# Patient Record
Sex: MALE | Race: WHITE | NOT HISPANIC OR LATINO
[De-identification: names, ages, dates, MRNs, and addresses within clinical notes are randomized per-mention and may not be internally consistent; named-entity substitution may affect disease eponyms.]

---

## 2017-11-30 ENCOUNTER — LAB SERVICES (OUTPATIENT)
Dept: OTHER | Age: 27
End: 2017-11-30

## 2017-11-30 ENCOUNTER — CHARTING TRANS (OUTPATIENT)
Dept: OTHER | Age: 27
End: 2017-11-30

## 2017-11-30 LAB — RAPID STREP GROUP A: POSITIVE

## 2017-11-30 ASSESSMENT — PAIN SCALES - GENERAL: PAINLEVEL_OUTOF10: 2

## 2018-11-02 VITALS
WEIGHT: 188.72 LBS | TEMPERATURE: 100.2 F | OXYGEN SATURATION: 97 % | HEART RATE: 119 BPM | BODY MASS INDEX: 25.56 KG/M2 | HEIGHT: 72 IN | RESPIRATION RATE: 18 BRPM

## 2022-08-09 ENCOUNTER — OFFICE VISIT (OUTPATIENT)
Dept: PEDIATRIC CARDIOLOGY | Age: 32
End: 2022-08-09

## 2022-08-09 ENCOUNTER — ANCILLARY PROCEDURE (OUTPATIENT)
Dept: PEDIATRIC CARDIOLOGY | Age: 32
End: 2022-08-09
Attending: PEDIATRICS

## 2022-08-09 VITALS
DIASTOLIC BLOOD PRESSURE: 80 MMHG | OXYGEN SATURATION: 97 % | BODY MASS INDEX: 27.87 KG/M2 | SYSTOLIC BLOOD PRESSURE: 144 MMHG | WEIGHT: 199.08 LBS | HEIGHT: 71 IN | HEART RATE: 92 BPM

## 2022-08-09 DIAGNOSIS — Q25.1 COARCTATION OF AORTA: ICD-10-CM

## 2022-08-09 DIAGNOSIS — Z87.74 S/P REPAIR OF COARCTATION OF AORTA: Primary | ICD-10-CM

## 2022-08-09 LAB
AORTIC ROOT: 2.78 CM (ref 2.49–3.54)
AORTIC VALVE ANNULUS: 2.25 CM (ref 1.76–2.57)
ASCENDING AORTA: 2.72 CM (ref 2.1–3.14)
BSA FOR PED ECHO PROCEDURE: 2.14 M2
FRACTIONAL SHORTENING: 45 % (ref 28–44)
LEFT VENTRICLE EJECTION FRACTION BY TEICHOLZ 2D (%): 77 %
LEFT VENTRICULAR POSTERIOR WALL IN END DIASTOLE (LVPW): 1.2 CM (ref 0.53–1.01)
LV SHORT-AXIS END-DIASTOLIC ENDOCARDIAL DIAMETER: 4.42 CM (ref 4.55–6.4)
LV SHORT-AXIS END-DIASTOLIC SEPTAL THICKNESS: 0.95 CM (ref 0.55–1.03)
LV SHORT-AXIS END-SYSTOLIC ENDOCARDIAL DIAMETER: 2.42 CM
LV THICKNESS:DIMENSION RATIO: 0.27 CM (ref 0.09–0.21)
SINOTUBULAR JUNCTION: 2.32 CM (ref 2.01–2.94)
Z SCORE OF AORTIC VALVE ANNULUS PHN: 0.4 CM
Z SCORE OF LEFT VENTRICULAR POSTERIOR WALL IN END DIASTOLE: 3.5 CM
Z SCORE OF LV SHORT-AXIS END-DIASTOLIC ENDOCARDIAL DIAMETER: -2.3 CM
Z SCORE OF LV SHORT-AXIS END-DIASTOLIC SEPTAL THICKNESS: 1.3 CM
Z SCORE OF LV THICKNESS:DIMENSION RATIO: 4
Z-SCORE OF AORTIC ROOT: -0.9 CM
Z-SCORE OF ASCENDING AORTA: 0.4 CM
Z-SCORE OF SINOTUBULAR JUNCTION PHN: -0.7 CM

## 2022-08-09 PROCEDURE — 93320 DOPPLER ECHO COMPLETE: CPT | Performed by: PEDIATRICS

## 2022-08-09 PROCEDURE — 93303 ECHO TRANSTHORACIC: CPT | Performed by: PEDIATRICS

## 2022-08-09 PROCEDURE — 93325 DOPPLER ECHO COLOR FLOW MAPG: CPT | Performed by: PEDIATRICS

## 2022-08-09 PROCEDURE — 99213 OFFICE O/P EST LOW 20 MIN: CPT | Performed by: PEDIATRICS

## 2022-08-09 ASSESSMENT — ENCOUNTER SYMPTOMS
LOSS OF CONSCIOUSNESS: 0
SHORTNESS OF BREATH: 0
HEADACHES: 0
EYE DISCHARGE: 0
CONSTITUTIONAL NEGATIVE: 1
PSYCHIATRIC NEGATIVE: 1
SPUTUM PRODUCTION: 0
EYE REDNESS: 0
BLURRED VISION: 0
DIZZINESS: 0
WHEEZING: 0
GASTROINTESTINAL NEGATIVE: 1
COUGH: 0
BRUISES/BLEEDS EASILY: 0
DOUBLE VISION: 0
ORTHOPNEA: 0
EYE PAIN: 0

## 2023-07-03 ENCOUNTER — APPOINTMENT (OUTPATIENT)
Dept: URBAN - METROPOLITAN AREA CLINIC 246 | Age: 33
Setting detail: DERMATOLOGY
End: 2023-07-03

## 2023-07-03 DIAGNOSIS — Z71.89 OTHER SPECIFIED COUNSELING: ICD-10-CM

## 2023-07-03 DIAGNOSIS — D485 NEOPLASM OF UNCERTAIN BEHAVIOR OF SKIN: ICD-10-CM

## 2023-07-03 DIAGNOSIS — D36.1 BENIGN NEOPLASM OF PERIPHERAL NERVES AND AUTONOMIC NERVOUS SYSTEM: ICD-10-CM

## 2023-07-03 DIAGNOSIS — L82.1 OTHER SEBORRHEIC KERATOSIS: ICD-10-CM

## 2023-07-03 DIAGNOSIS — D22 MELANOCYTIC NEVI: ICD-10-CM

## 2023-07-03 DIAGNOSIS — L81.4 OTHER MELANIN HYPERPIGMENTATION: ICD-10-CM

## 2023-07-03 DIAGNOSIS — L72.8 OTHER FOLLICULAR CYSTS OF THE SKIN AND SUBCUTANEOUS TISSUE: ICD-10-CM

## 2023-07-03 DIAGNOSIS — D18.0 HEMANGIOMA: ICD-10-CM

## 2023-07-03 PROBLEM — D36.11 BENIGN NEOPLASM OF PERIPHERAL NERVES AND AUTONOMIC NERVOUS SYSTEM OF FACE, HEAD, AND NECK: Status: ACTIVE | Noted: 2023-07-03

## 2023-07-03 PROBLEM — D22.61 MELANOCYTIC NEVI OF RIGHT UPPER LIMB, INCLUDING SHOULDER: Status: ACTIVE | Noted: 2023-07-03

## 2023-07-03 PROBLEM — D48.5 NEOPLASM OF UNCERTAIN BEHAVIOR OF SKIN: Status: ACTIVE | Noted: 2023-07-03

## 2023-07-03 PROBLEM — D18.01 HEMANGIOMA OF SKIN AND SUBCUTANEOUS TISSUE: Status: ACTIVE | Noted: 2023-07-03

## 2023-07-03 PROCEDURE — A4550 SURGICAL TRAYS: HCPCS

## 2023-07-03 PROCEDURE — OTHER COUNSELING: OTHER

## 2023-07-03 PROCEDURE — OTHER BIOPSY BY SHAVE METHOD: OTHER

## 2023-07-03 PROCEDURE — 99213 OFFICE O/P EST LOW 20 MIN: CPT | Mod: 25

## 2023-07-03 PROCEDURE — OTHER MIPS QUALITY: OTHER

## 2023-07-03 PROCEDURE — OTHER ADDITIONAL NOTES: OTHER

## 2023-07-03 PROCEDURE — 11102 TANGNTL BX SKIN SINGLE LES: CPT

## 2023-07-03 PROCEDURE — 11103 TANGNTL BX SKIN EA SEP/ADDL: CPT

## 2023-07-03 ASSESSMENT — LOCATION SIMPLE DESCRIPTION DERM
LOCATION SIMPLE: RIGHT SHOULDER
LOCATION SIMPLE: SCALP
LOCATION SIMPLE: RIGHT UPPER BACK
LOCATION SIMPLE: LEFT CHEEK
LOCATION SIMPLE: ABDOMEN
LOCATION SIMPLE: RIGHT CHEEK
LOCATION SIMPLE: CHEST
LOCATION SIMPLE: LEFT FOREHEAD
LOCATION SIMPLE: LEFT UPPER BACK

## 2023-07-03 ASSESSMENT — LOCATION DETAILED DESCRIPTION DERM
LOCATION DETAILED: STERNUM
LOCATION DETAILED: RIGHT SUPERIOR LATERAL UPPER BACK
LOCATION DETAILED: PERIUMBILICAL SKIN
LOCATION DETAILED: RIGHT INFERIOR UPPER BACK
LOCATION DETAILED: LEFT INFERIOR MEDIAL UPPER BACK
LOCATION DETAILED: RIGHT SUPERIOR PARIETAL SCALP
LOCATION DETAILED: LEFT SUPERIOR FOREHEAD
LOCATION DETAILED: RIGHT POSTERIOR SHOULDER
LOCATION DETAILED: EPIGASTRIC SKIN
LOCATION DETAILED: LEFT CENTRAL MALAR CHEEK
LOCATION DETAILED: RIGHT CENTRAL MALAR CHEEK
LOCATION DETAILED: STERNAL NOTCH

## 2023-07-03 ASSESSMENT — LOCATION ZONE DERM
LOCATION ZONE: TRUNK
LOCATION ZONE: FACE
LOCATION ZONE: SCALP
LOCATION ZONE: ARM

## 2023-07-03 NOTE — PROCEDURE: BIOPSY BY SHAVE METHOD
Detail Level: Detailed
Depth Of Biopsy: dermis
Was A Bandage Applied: Yes
Size Of Lesion In Cm: 0.6
X Size Of Lesion In Cm: 0
Biopsy Type: H and E
Biopsy Method: Dermablade
Anesthesia Type: 1% lidocaine with epinephrine
Anesthesia Volume In Cc (Will Not Render If 0): 0.5
Hemostasis: Ferric chloride
Wound Care: Petrolatum
Dressing: bandage
Destruction After The Procedure: No
Type Of Destruction Used: Curettage
Curettage Text: The wound bed was treated with curettage after the biopsy was performed.
Cryotherapy Text: The wound bed was treated with cryotherapy after the biopsy was performed.
Electrodesiccation Text: The wound bed was treated with electrodesiccation after the biopsy was performed.
Electrodesiccation And Curettage Text: The wound bed was treated with electrodesiccation and curettage after the biopsy was performed.
Silver Nitrate Text: The wound bed was treated with silver nitrate after the biopsy was performed.
Lab: [id number]
Consent: Written consent was obtained and risks were reviewed including but not limited to scarring, infection, bleeding, scabbing, incomplete removal, nerve damage and allergy to anesthesia.
Post-Care Instructions: I reviewed with the patient in detail post-care instructions. Patient is to keep the biopsy site dry and covered with bandage overnight. Next day, cleanse biopsy site with mild soap and water, apply Vaseline and new bandage until site has scabbed/healed.
Notification Instructions: Patient will be notified of biopsy results. However, patient instructed to call the office if not contacted within 2 weeks.
Billing Type: Third-Party Bill
Information: Selecting Yes will display possible errors in your note based on the variables you have selected. This validation is only offered as a suggestion for you. PLEASE NOTE THAT THE VALIDATION TEXT WILL BE REMOVED WHEN YOU FINALIZE YOUR NOTE. IF YOU WANT TO FAX A PRELIMINARY NOTE YOU WILL NEED TO TOGGLE THIS TO 'NO' IF YOU DO NOT WANT IT IN YOUR FAXED NOTE.

## 2023-07-03 NOTE — PROCEDURE: ADDITIONAL NOTES
Additional Notes: Discussed treatment options of excision
Render Risk Assessment In Note?: no
Detail Level: Generalized

## 2023-10-10 ENCOUNTER — OFFICE VISIT (OUTPATIENT)
Dept: PEDIATRIC CARDIOLOGY | Age: 33
End: 2023-10-10

## 2023-10-10 ENCOUNTER — ANCILLARY PROCEDURE (OUTPATIENT)
Dept: PEDIATRIC CARDIOLOGY | Age: 33
End: 2023-10-10
Attending: PEDIATRICS

## 2023-10-10 VITALS
HEIGHT: 70 IN | WEIGHT: 180.34 LBS | HEART RATE: 82 BPM | BODY MASS INDEX: 25.82 KG/M2 | SYSTOLIC BLOOD PRESSURE: 149 MMHG | DIASTOLIC BLOOD PRESSURE: 84 MMHG | OXYGEN SATURATION: 98 %

## 2023-10-10 DIAGNOSIS — Z87.74 S/P REPAIR OF COARCTATION OF AORTA: Primary | ICD-10-CM

## 2023-10-10 DIAGNOSIS — Q23.1 BICUSPID AORTIC VALVE: ICD-10-CM

## 2023-10-10 DIAGNOSIS — Q25.1 COARCTATION OF AORTA: ICD-10-CM

## 2023-10-10 LAB
AORTIC ROOT: 2.78 CM (ref 2.42–3.43)
AORTIC VALVE ANNULUS: 2.26 CM (ref 1.71–2.5)
ASCENDING AORTA: 2.87 CM (ref 2.04–3.05)
BSA FOR PED ECHO PROCEDURE: 2.02 M2
FRACTIONAL SHORTENING: 38 %
LEFT VENTRICLE EJECTION FRACTION BY TEICHOLZ 2D (%): 69 %
LEFT VENTRICULAR POSTERIOR WALL IN END DIASTOLE (LVPW): 0.9 CM (ref 0.52–0.99)
LV SHORT-AXIS END-DIASTOLIC ENDOCARDIAL DIAMETER: 4.46 CM (ref 4.44–6.24)
LV SHORT-AXIS END-DIASTOLIC SEPTAL THICKNESS: 0.81 CM (ref 0.53–1)
LV SHORT-AXIS END-SYSTOLIC ENDOCARDIAL DIAMETER: 2.76 CM
LV THICKNESS:DIMENSION RATIO: 0.2 CM (ref 0.09–0.21)
SINOTUBULAR JUNCTION: 2.46 CM (ref 1.95–2.85)
Z SCORE OF AORTIC VALVE ANNULUS PHN: 0.8 CM
Z SCORE OF LEFT VENTRICULAR POSTERIOR WALL IN END DIASTOLE: 1.2 CM
Z SCORE OF LV SHORT-AXIS END-DIASTOLIC ENDOCARDIAL DIAMETER: -1.9 CM
Z SCORE OF LV SHORT-AXIS END-DIASTOLIC SEPTAL THICKNESS: 0.3 CM
Z SCORE OF LV THICKNESS:DIMENSION RATIO: 1.7
Z-SCORE OF AORTIC ROOT: -0.6 CM
Z-SCORE OF ASCENDING AORTA: 1.3 CM
Z-SCORE OF SINOTUBULAR JUNCTION PHN: 0.3 CM

## 2023-10-10 PROCEDURE — 99243 OFF/OP CNSLTJ NEW/EST LOW 30: CPT | Performed by: PEDIATRICS

## 2023-10-10 PROCEDURE — 93325 DOPPLER ECHO COLOR FLOW MAPG: CPT | Performed by: PEDIATRICS

## 2023-10-10 PROCEDURE — 93303 ECHO TRANSTHORACIC: CPT | Performed by: PEDIATRICS

## 2023-10-10 PROCEDURE — 93320 DOPPLER ECHO COMPLETE: CPT | Performed by: PEDIATRICS

## 2023-10-10 RX ORDER — ENALAPRIL MALEATE 5 MG/1
5 TABLET ORAL DAILY
Qty: 30 TABLET | Refills: 3 | Status: SHIPPED | OUTPATIENT
Start: 2023-10-10

## 2023-10-10 ASSESSMENT — ENCOUNTER SYMPTOMS
HEADACHES: 0
CONSTITUTIONAL NEGATIVE: 1
SPUTUM PRODUCTION: 0
BRUISES/BLEEDS EASILY: 0
PSYCHIATRIC NEGATIVE: 1
GASTROINTESTINAL NEGATIVE: 1
EYE DISCHARGE: 0
SHORTNESS OF BREATH: 0
EYE REDNESS: 0
BLURRED VISION: 0
EYE PAIN: 0
LOSS OF CONSCIOUSNESS: 0
DOUBLE VISION: 0
COUGH: 0
WHEEZING: 0
DIZZINESS: 0
ORTHOPNEA: 0

## 2024-02-09 RX ORDER — ENALAPRIL MALEATE 5 MG/1
5 TABLET ORAL DAILY
Qty: 30 TABLET | Refills: 9 | Status: SHIPPED | OUTPATIENT
Start: 2024-02-09

## 2024-02-19 ENCOUNTER — APPOINTMENT (OUTPATIENT)
Dept: URBAN - METROPOLITAN AREA CLINIC 246 | Age: 34
Setting detail: DERMATOLOGY
End: 2024-02-20

## 2024-02-19 DIAGNOSIS — L81.4 OTHER MELANIN HYPERPIGMENTATION: ICD-10-CM

## 2024-02-19 DIAGNOSIS — D22 MELANOCYTIC NEVI: ICD-10-CM

## 2024-02-19 DIAGNOSIS — L72.0 EPIDERMAL CYST: ICD-10-CM

## 2024-02-19 DIAGNOSIS — D18.0 HEMANGIOMA: ICD-10-CM

## 2024-02-19 DIAGNOSIS — L82.1 OTHER SEBORRHEIC KERATOSIS: ICD-10-CM

## 2024-02-19 DIAGNOSIS — Z71.89 OTHER SPECIFIED COUNSELING: ICD-10-CM

## 2024-02-19 PROBLEM — D22.5 MELANOCYTIC NEVI OF TRUNK: Status: ACTIVE | Noted: 2024-02-19

## 2024-02-19 PROBLEM — D18.01 HEMANGIOMA OF SKIN AND SUBCUTANEOUS TISSUE: Status: ACTIVE | Noted: 2024-02-19

## 2024-02-19 PROCEDURE — 99213 OFFICE O/P EST LOW 20 MIN: CPT

## 2024-02-19 PROCEDURE — OTHER MIPS QUALITY: OTHER

## 2024-02-19 PROCEDURE — OTHER LOCATION MARKER (SIMPLE): OTHER

## 2024-02-19 PROCEDURE — OTHER COUNSELING: OTHER

## 2024-02-19 PROCEDURE — OTHER PHOTO-DOCUMENTATION: OTHER

## 2024-02-19 ASSESSMENT — LOCATION SIMPLE DESCRIPTION DERM
LOCATION SIMPLE: CHEST
LOCATION SIMPLE: RIGHT SHOULDER
LOCATION SIMPLE: CHEST
LOCATION SIMPLE: LEFT UPPER BACK
LOCATION SIMPLE: ABDOMEN

## 2024-02-19 ASSESSMENT — LOCATION DETAILED DESCRIPTION DERM
LOCATION DETAILED: EPIGASTRIC SKIN
LOCATION DETAILED: STERNUM
LOCATION DETAILED: LEFT SUPERIOR UPPER BACK
LOCATION DETAILED: RIGHT POSTERIOR SHOULDER
LOCATION DETAILED: RIGHT MEDIAL INFERIOR CHEST

## 2024-02-19 ASSESSMENT — LOCATION ZONE DERM
LOCATION ZONE: TRUNK
LOCATION ZONE: TRUNK
LOCATION ZONE: ARM

## 2024-10-03 ENCOUNTER — HOSPITAL ENCOUNTER (OUTPATIENT)
Dept: NUCLEAR MEDICINE | Age: 34
Discharge: HOME | End: 2024-10-03
Attending: UROLOGY

## 2024-10-03 DIAGNOSIS — N13.5 URETERAL OBSTRUCTION, RIGHT: ICD-10-CM

## 2024-10-03 PROCEDURE — A9562 TC99M MERTIATIDE: HCPCS | Performed by: UROLOGY

## 2024-10-03 PROCEDURE — 10006150 HB RX 343: Performed by: UROLOGY

## 2024-10-03 RX ADMIN — TECHNESCAN TC 99M MERTIATIDE 10.5 MILLICURIE: 1 INJECTION, POWDER, LYOPHILIZED, FOR SOLUTION INTRAVENOUS at 08:25

## 2024-12-16 RX ORDER — ENALAPRIL MALEATE 5 MG/1
5 TABLET ORAL DAILY
Qty: 90 TABLET | Refills: 0 | Status: SHIPPED | OUTPATIENT
Start: 2024-12-16

## 2025-03-25 ENCOUNTER — TELEPHONE (OUTPATIENT)
Dept: PEDIATRIC CARDIOLOGY | Age: 35
End: 2025-03-25

## 2025-03-25 RX ORDER — ENALAPRIL MALEATE 5 MG/1
5 TABLET ORAL DAILY
Qty: 30 TABLET | Refills: 0 | Status: SHIPPED | OUTPATIENT
Start: 2025-03-25